# Patient Record
Sex: FEMALE
[De-identification: names, ages, dates, MRNs, and addresses within clinical notes are randomized per-mention and may not be internally consistent; named-entity substitution may affect disease eponyms.]

---

## 2023-08-01 ENCOUNTER — APPOINTMENT (OUTPATIENT)
Dept: ORTHOPEDIC SURGERY | Facility: CLINIC | Age: 38
End: 2023-08-01
Payer: COMMERCIAL

## 2023-08-01 VITALS
HEART RATE: 103 BPM | HEIGHT: 64 IN | BODY MASS INDEX: 30.9 KG/M2 | OXYGEN SATURATION: 98 % | SYSTOLIC BLOOD PRESSURE: 126 MMHG | DIASTOLIC BLOOD PRESSURE: 90 MMHG | WEIGHT: 181 LBS

## 2023-08-01 DIAGNOSIS — Z80.9 FAMILY HISTORY OF MALIGNANT NEOPLASM, UNSPECIFIED: ICD-10-CM

## 2023-08-01 DIAGNOSIS — M65.9 SYNOVITIS AND TENOSYNOVITIS, UNSPECIFIED: ICD-10-CM

## 2023-08-01 PROBLEM — Z00.00 ENCOUNTER FOR PREVENTIVE HEALTH EXAMINATION: Status: ACTIVE | Noted: 2023-08-01

## 2023-08-01 PROCEDURE — 99203 OFFICE O/P NEW LOW 30 MIN: CPT

## 2023-08-01 NOTE — ASSESSMENT
[FreeTextEntry1] : IRMA ADRIAN is a 38 year old female with left ankle pain. 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 We reviewed the natural history of this condition and treatment options. We agreed on the following plan:  Xray reviewed with patient today. Start Home Exercises for ankle conditioning. Demonstration and handout provided.  Physical therapy. Referral provided. Recommend 150 min per week of moderate intensity aerobic activity  Medication: Diclofenac gel prn. Ice, elevate Advised lace up or velcro brace  Advanced imaging: consider MRI if no symptomatic improvement.  Follow up in 6-8 weeks.

## 2023-08-01 NOTE — HISTORY OF PRESENT ILLNESS
[de-identified] : IRMA ADRIAN is a 38 year old female who presents with left ankle pain. States the onset of pain was May 2023 Trip and fall in bathroom and struck medial left ankle on tub rail. She developed pain and swelling at that time. It resolved spontaneously. In July developed swelling at proximal to ankle at medial distal tibia and shooting pain. She presented to ED. Had XR which were negative for fracture.  Pain is anterior and posterior to medial malleolus  Pain is radiates proximally. There is associated swelling There is no associated numbness, paraesthesia or weakness. Exacerbating factors are standing, walking for prolonged periods. Has tried rest, ice. Not taking PO analgesia Patient ambulates independently. Does not exercise regularly.  Has not tried PT.

## 2023-08-01 NOTE — PHYSICAL EXAM
[de-identified] : General: Well-nourished, well-developed, alert, and in no acute distress. Head: Normocephalic. Eyes: Pupils equal, extraocular muscles intact, normal sclera. Nose: No nasal discharge. Cardiovascular: Extremities are warm and well perfused. Distal pulses are symmetric bilaterally. Respiratory: No labored breathing. Extremities: Sensation is intact distally bilaterally. Distal pulses are symmetric bilaterally Lymphatic: No regional lymphadenopathy, no lymphedema Neurologic: No focal deficits Skin: Normal skin color, texture, and turgor Psychiatric: Normal affect   MSK: Examination of left ankle Gait normal, non-antalgic Able to toe walk, heel walk Ambulating independently Inspection: no ecchymosis, effusion Alignment: pes planus  Tender to palpation: tibialis anterior tendon, posterior tibialis tendon Nontender to palpation: medial malleolus, lateral malleolus, base of 5th metatarsal, navicular, ATFL, CFL, PTFL, AITFL, Achilles tendon, FHL, peroneals, plantar fascia  ROM: Plantar flexion 45 deg, dorsiflexion 10 deg, inversion 20 deg, eversion 30 deg Special Tests:  Anterior Drawer (ATFL): negative for pain and laxity Talar Tilt (CFL): negative for pain and laxity Kleigers (ext rot): negative for pain and laxity at deltoid ligament or distal fibula Squeeze test: negative at proximal or distal syndesmosis Bump test: negative at talar dome, syndesmosis Greer test negative  Sensation is intact to light touch over the superficial and deep peroneal nerve distributions and the posterior tibial nerve distribution. Capillary refill is less than two seconds. Posterior tibial and dorsalis pedis pulses 2+ equal bilaterally. No calf swelling or tenderness bilaterally. Strength testing shows 5/5 Quads, 5/5 Hamstrings, 5/5 EHL, 5/5 FHL, 5/5 tibialis anterior, 5/5 gastroc-soleus complex.  Reflexes: Patellar 2+, Achilles 2+, Babinski negative    [de-identified] : XR left ankle (7/27/23): There is no evidence of fracture or dislocation. The ankle mortise is intact.

## 2023-09-19 ENCOUNTER — APPOINTMENT (OUTPATIENT)
Dept: ORTHOPEDIC SURGERY | Facility: CLINIC | Age: 38
End: 2023-09-19
Payer: COMMERCIAL

## 2023-09-19 VITALS
DIASTOLIC BLOOD PRESSURE: 94 MMHG | OXYGEN SATURATION: 100 % | TEMPERATURE: 36.2 F | SYSTOLIC BLOOD PRESSURE: 142 MMHG | WEIGHT: 181 LBS | HEART RATE: 83 BPM | BODY MASS INDEX: 30.9 KG/M2 | HEIGHT: 64 IN

## 2023-09-19 DIAGNOSIS — Z78.9 OTHER SPECIFIED HEALTH STATUS: ICD-10-CM

## 2023-09-19 DIAGNOSIS — M76.822 POSTERIOR TIBIAL TENDINITIS, LEFT LEG: ICD-10-CM

## 2023-09-19 PROCEDURE — 99213 OFFICE O/P EST LOW 20 MIN: CPT
